# Patient Record
(demographics unavailable — no encounter records)

---

## 2025-07-22 NOTE — PHYSICAL EXAM
[NL] : no acute distress, alert [de-identified] : right thigh appears nl- no evidence of tick attachment

## 2025-07-22 NOTE — HISTORY OF PRESENT ILLNESS
[de-identified] : s/p tick bite [FreeTextEntry6] :  Pt removed a "large" tick from right thigh today. Tick was imbedded   Duration of attachment not known, but may have gotten while playing golf the day before Pt asymptomatic

## 2025-07-22 NOTE — PLAN
[TextEntry] : Will use prophylactic doxy protocol. pt to call if rash or other sx's develop in next few weeks

## 2025-07-27 NOTE — PHYSICAL EXAM
[NL] : no acute distress, alert [de-identified] : right thigh (at site of tick bite): skin with non-circumscribed erythema. No border. No central clearing

## 2025-07-27 NOTE — HISTORY OF PRESENT ILLNESS
[de-identified] : rash [FreeTextEntry6] :  Pt seen 7/22 s/p tick bite   Pt took single dose of doxy. After OV that day pt developed an itchy rash at site of tick bite

## 2025-07-27 NOTE — HISTORY OF PRESENT ILLNESS
[de-identified] : rash [FreeTextEntry6] :  Pt seen 7/22 s/p tick bite   Pt took single dose of doxy. After OV that day pt developed an itchy rash at site of tick bite

## 2025-07-27 NOTE — PHYSICAL EXAM
[NL] : no acute distress, alert [de-identified] : right thigh (at site of tick bite): skin with non-circumscribed erythema. No border. No central clearing